# Patient Record
Sex: MALE | Race: WHITE | NOT HISPANIC OR LATINO | Employment: OTHER | URBAN - METROPOLITAN AREA
[De-identification: names, ages, dates, MRNs, and addresses within clinical notes are randomized per-mention and may not be internally consistent; named-entity substitution may affect disease eponyms.]

---

## 2023-02-13 ENCOUNTER — HOSPITAL ENCOUNTER (OUTPATIENT)
Facility: HOSPITAL | Age: 53
Discharge: HOME | End: 2023-02-13
Attending: EMERGENCY MEDICINE | Admitting: EMERGENCY MEDICINE
Payer: MEDICARE

## 2023-02-13 VITALS
OXYGEN SATURATION: 97 % | SYSTOLIC BLOOD PRESSURE: 147 MMHG | WEIGHT: 265 LBS | HEART RATE: 79 BPM | BODY MASS INDEX: 44.15 KG/M2 | TEMPERATURE: 98.4 F | RESPIRATION RATE: 18 BRPM | HEIGHT: 65 IN | DIASTOLIC BLOOD PRESSURE: 83 MMHG

## 2023-02-13 DIAGNOSIS — H10.9 CONJUNCTIVITIS, UNSPECIFIED CONJUNCTIVITIS TYPE, UNSPECIFIED LATERALITY: Primary | ICD-10-CM

## 2023-02-13 PROCEDURE — G0463 HOSPITAL OUTPT CLINIC VISIT: HCPCS | Performed by: EMERGENCY MEDICINE

## 2023-02-13 PROCEDURE — 99203 OFFICE O/P NEW LOW 30 MIN: CPT | Performed by: EMERGENCY MEDICINE

## 2023-02-13 RX ORDER — POLYMYXIN B SULFATE AND TRIMETHOPRIM 1; 10000 MG/ML; [USP'U]/ML
1 SOLUTION OPHTHALMIC EVERY 4 HOURS
Qty: 10 ML | Refills: 0 | Status: SHIPPED | OUTPATIENT
Start: 2023-02-13 | End: 2023-02-20

## 2023-02-13 NOTE — FSED PROVIDER NOTE
Subjective   History of Present Illness  52-year-old male presents the emergency room with left eye drainage and tearing denies wearing any contacts denies any pain upon extraocular motor denies any blurred vision denies any fevers or chills nausea headache or neck stiffness denies a cough congestion nausea vomiting diarrhea denies any flank pain chest pain or shortness of breath  ED for further eval        Review of Systems   Constitutional: Negative.    HENT: Negative.    Eyes: Positive for discharge and itching.   Respiratory: Negative.    Cardiovascular: Negative.    Gastrointestinal: Negative.    Endocrine: Negative.    Genitourinary: Negative.    Skin: Negative.    Allergic/Immunologic: Negative.    Neurological: Negative.    Hematological: Negative.    Psychiatric/Behavioral: Negative.        No past medical history on file.    Allergies   Allergen Reactions   • Wellbutrin [Bupropion] Hives       No past surgical history on file.    No family history on file.             Objective   Physical Exam  Vitals and nursing note reviewed.   Constitutional:       Appearance: Normal appearance.   HENT:      Head: Normocephalic.      Right Ear: Tympanic membrane and ear canal normal.      Left Ear: Tympanic membrane and ear canal normal.      Nose: Nose normal.      Mouth/Throat:      Mouth: Mucous membranes are moist.      Pharynx: Oropharynx is clear.   Eyes:      Extraocular Movements: Extraocular movements intact.      Conjunctiva/sclera:      Left eye: Left conjunctiva is injected. Exudate present.      Pupils: Pupils are equal, round, and reactive to light.   Cardiovascular:      Rate and Rhythm: Normal rate and regular rhythm.      Pulses: Normal pulses.      Heart sounds: Normal heart sounds.   Pulmonary:      Effort: Pulmonary effort is normal.      Breath sounds: Normal breath sounds.   Abdominal:      General: Abdomen is flat.      Palpations: Abdomen is soft.   Musculoskeletal:         General: Normal range  of motion.      Cervical back: Normal range of motion and neck supple.   Skin:     General: Skin is warm and dry.      Capillary Refill: Capillary refill takes less than 2 seconds.   Neurological:      General: No focal deficit present.      Mental Status: He is alert and oriented to person, place, and time.   Psychiatric:         Mood and Affect: Mood normal.         Behavior: Behavior normal.         Procedures           ED Course                                           Medical Decision Making  Patient has exam findings consistent with conjunctivitis does not wear contacts will prescribe patient Polytrim will discharge patient with primary care follow-up and question precautions patient expressed understanding treatment plan        Final diagnoses:   Conjunctivitis, unspecified conjunctivitis type, unspecified laterality       ED Disposition  ED Disposition     ED Disposition   Discharge    Condition   Stable    Comment   --             PATIENT [facility] - [place] [id number]  [phone]             Medication List      New Prescriptions    trimethoprim-polymyxin b 46116-2.1 UNIT/ML-% ophthalmic solution  Commonly known as: Polytrim  Administer 1 drop into the left eye Every 4 (Four) Hours for 7 days.           Where to Get Your Medications      These medications were sent to [facility] [id number] - [place], [place] - [facility] - [phone] [facility] [phone] [address] [place] [address]    Phone: [phone]   · trimethoprim-polymyxin b 57717-1.1 UNIT/ML-% ophthalmic solution

## 2023-02-16 ENCOUNTER — HOSPITAL ENCOUNTER (EMERGENCY)
Facility: HOSPITAL | Age: 53
Discharge: HOME | End: 2023-02-17
Attending: EMERGENCY MEDICINE | Admitting: EMERGENCY MEDICINE
Payer: MEDICARE

## 2023-02-16 VITALS
RESPIRATION RATE: 18 BRPM | DIASTOLIC BLOOD PRESSURE: 67 MMHG | HEIGHT: 65 IN | BODY MASS INDEX: 44.15 KG/M2 | TEMPERATURE: 98.7 F | OXYGEN SATURATION: 95 % | WEIGHT: 265 LBS | SYSTOLIC BLOOD PRESSURE: 120 MMHG | HEART RATE: 83 BPM

## 2023-02-16 DIAGNOSIS — R19.7 DIARRHEA, UNSPECIFIED TYPE: Primary | ICD-10-CM

## 2023-02-16 LAB
FLUAV SUBTYP SPEC NAA+PROBE: NOT DETECTED
FLUBV RNA ISLT QL NAA+PROBE: NOT DETECTED
SARS-COV-2 RNA RESP QL NAA+PROBE: NOT DETECTED

## 2023-02-16 PROCEDURE — 87636 SARSCOV2 & INF A&B AMP PRB: CPT

## 2023-02-16 PROCEDURE — 99283 EMERGENCY DEPT VISIT LOW MDM: CPT

## 2023-02-16 PROCEDURE — C9803 HOPD COVID-19 SPEC COLLECT: HCPCS

## 2023-02-17 ENCOUNTER — APPOINTMENT (OUTPATIENT)
Dept: CT IMAGING | Facility: HOSPITAL | Age: 53
End: 2023-02-17
Payer: MEDICARE

## 2023-02-17 LAB
ALBUMIN SERPL-MCNC: 4.3 G/DL (ref 3.5–5.2)
ALBUMIN/GLOB SERPL: 1.3 G/DL
ALP SERPL-CCNC: 85 U/L (ref 39–117)
ALT SERPL W P-5'-P-CCNC: 26 U/L (ref 1–41)
ANION GAP SERPL CALCULATED.3IONS-SCNC: 10.5 MMOL/L (ref 5–15)
AST SERPL-CCNC: 18 U/L (ref 1–40)
BASOPHILS # BLD AUTO: 0.04 10*3/MM3 (ref 0–0.2)
BASOPHILS NFR BLD AUTO: 0.2 % (ref 0–1.5)
BILIRUB SERPL-MCNC: 0.5 MG/DL (ref 0–1.2)
BILIRUB UR QL STRIP: NEGATIVE
BUN SERPL-MCNC: 36 MG/DL (ref 6–20)
BUN/CREAT SERPL: 27.1 (ref 7–25)
CALCIUM SPEC-SCNC: 9.4 MG/DL (ref 8.6–10.5)
CHLORIDE SERPL-SCNC: 102 MMOL/L (ref 98–107)
CLARITY UR: CLEAR
CO2 SERPL-SCNC: 23.5 MMOL/L (ref 22–29)
COLOR UR: YELLOW
CREAT SERPL-MCNC: 1.33 MG/DL (ref 0.76–1.27)
DEPRECATED RDW RBC AUTO: 44.3 FL (ref 37–54)
EGFRCR SERPLBLD CKD-EPI 2021: 63.9 ML/MIN/1.73
EOSINOPHIL # BLD AUTO: 0.32 10*3/MM3 (ref 0–0.4)
EOSINOPHIL NFR BLD AUTO: 1.9 % (ref 0.3–6.2)
ERYTHROCYTE [DISTWIDTH] IN BLOOD BY AUTOMATED COUNT: 13.1 % (ref 12.3–15.4)
GLOBULIN UR ELPH-MCNC: 3.4 GM/DL
GLUCOSE SERPL-MCNC: 86 MG/DL (ref 65–99)
GLUCOSE UR STRIP-MCNC: NEGATIVE MG/DL
HCT VFR BLD AUTO: 45.5 % (ref 37.5–51)
HGB BLD-MCNC: 14.7 G/DL (ref 13–17.7)
HGB UR QL STRIP.AUTO: NEGATIVE
IMM GRANULOCYTES # BLD AUTO: 0.17 10*3/MM3 (ref 0–0.05)
IMM GRANULOCYTES NFR BLD AUTO: 1 % (ref 0–0.5)
KETONES UR QL STRIP: NEGATIVE
LEUKOCYTE ESTERASE UR QL STRIP.AUTO: NEGATIVE
LYMPHOCYTES # BLD AUTO: 2.07 10*3/MM3 (ref 0.7–3.1)
LYMPHOCYTES NFR BLD AUTO: 12.5 % (ref 19.6–45.3)
MCH RBC QN AUTO: 29.2 PG (ref 26.6–33)
MCHC RBC AUTO-ENTMCNC: 32.3 G/DL (ref 31.5–35.7)
MCV RBC AUTO: 90.3 FL (ref 79–97)
MONOCYTES # BLD AUTO: 1.16 10*3/MM3 (ref 0.1–0.9)
MONOCYTES NFR BLD AUTO: 7 % (ref 5–12)
NEUTROPHILS NFR BLD AUTO: 12.75 10*3/MM3 (ref 1.7–7)
NEUTROPHILS NFR BLD AUTO: 77.4 % (ref 42.7–76)
NITRITE UR QL STRIP: NEGATIVE
PH UR STRIP.AUTO: 5.5 [PH] (ref 5–8)
PLATELET # BLD AUTO: 344 10*3/MM3 (ref 140–450)
PMV BLD AUTO: 9.5 FL (ref 6–12)
POTASSIUM SERPL-SCNC: 5.1 MMOL/L (ref 3.5–5.2)
PROT SERPL-MCNC: 7.7 G/DL (ref 6–8.5)
PROT UR QL STRIP: ABNORMAL
RBC # BLD AUTO: 5.04 10*6/MM3 (ref 4.14–5.8)
SODIUM SERPL-SCNC: 136 MMOL/L (ref 136–145)
SP GR UR STRIP: 1.02 (ref 1–1.03)
UROBILINOGEN UR QL STRIP: ABNORMAL
WBC NRBC COR # BLD: 16.51 10*3/MM3 (ref 3.4–10.8)

## 2023-02-17 PROCEDURE — 25010000002 ONDANSETRON PER 1 MG: Performed by: EMERGENCY MEDICINE

## 2023-02-17 PROCEDURE — 85025 COMPLETE CBC W/AUTO DIFF WBC: CPT | Performed by: EMERGENCY MEDICINE

## 2023-02-17 PROCEDURE — 74177 CT ABD & PELVIS W/CONTRAST: CPT

## 2023-02-17 PROCEDURE — 81003 URINALYSIS AUTO W/O SCOPE: CPT | Performed by: EMERGENCY MEDICINE

## 2023-02-17 PROCEDURE — 99283 EMERGENCY DEPT VISIT LOW MDM: CPT | Performed by: EMERGENCY MEDICINE

## 2023-02-17 PROCEDURE — 0 IOPAMIDOL PER 1 ML: Performed by: EMERGENCY MEDICINE

## 2023-02-17 PROCEDURE — 96375 TX/PRO/DX INJ NEW DRUG ADDON: CPT

## 2023-02-17 PROCEDURE — 96374 THER/PROPH/DIAG INJ IV PUSH: CPT

## 2023-02-17 PROCEDURE — 80053 COMPREHEN METABOLIC PANEL: CPT | Performed by: EMERGENCY MEDICINE

## 2023-02-17 PROCEDURE — 96361 HYDRATE IV INFUSION ADD-ON: CPT

## 2023-02-17 RX ORDER — ONDANSETRON 4 MG/1
4 TABLET, ORALLY DISINTEGRATING ORAL EVERY 8 HOURS PRN
Qty: 12 TABLET | Refills: 0 | Status: SHIPPED | OUTPATIENT
Start: 2023-02-17

## 2023-02-17 RX ORDER — METRONIDAZOLE 500 MG/1
500 TABLET ORAL 2 TIMES DAILY
Qty: 14 TABLET | Refills: 0 | Status: SHIPPED | OUTPATIENT
Start: 2023-02-17 | End: 2023-02-17 | Stop reason: SDUPTHER

## 2023-02-17 RX ORDER — FAMOTIDINE 10 MG/ML
20 INJECTION, SOLUTION INTRAVENOUS ONCE
Status: COMPLETED | OUTPATIENT
Start: 2023-02-17 | End: 2023-02-17

## 2023-02-17 RX ORDER — METRONIDAZOLE 500 MG/1
500 TABLET ORAL 2 TIMES DAILY
Qty: 14 TABLET | Refills: 0 | Status: SHIPPED | OUTPATIENT
Start: 2023-02-17 | End: 2023-02-24

## 2023-02-17 RX ORDER — ONDANSETRON 4 MG/1
4 TABLET, ORALLY DISINTEGRATING ORAL EVERY 8 HOURS PRN
Qty: 12 TABLET | Refills: 0 | Status: SHIPPED | OUTPATIENT
Start: 2023-02-17 | End: 2023-02-17 | Stop reason: SDUPTHER

## 2023-02-17 RX ORDER — ONDANSETRON 2 MG/ML
4 INJECTION INTRAMUSCULAR; INTRAVENOUS ONCE
Status: COMPLETED | OUTPATIENT
Start: 2023-02-17 | End: 2023-02-17

## 2023-02-17 RX ADMIN — FAMOTIDINE 20 MG: 10 INJECTION, SOLUTION INTRAVENOUS at 02:40

## 2023-02-17 RX ADMIN — IOPAMIDOL 100 ML: 755 INJECTION, SOLUTION INTRAVENOUS at 01:34

## 2023-02-17 RX ADMIN — SODIUM CHLORIDE 1000 ML: 0.9 INJECTION, SOLUTION INTRAVENOUS at 01:45

## 2023-02-17 RX ADMIN — ONDANSETRON 4 MG: 2 INJECTION INTRAMUSCULAR; INTRAVENOUS at 02:40

## 2023-02-17 NOTE — FSED PROVIDER NOTE
Subjective   History of Present Illness  52 yom complains of loss of appetite and diarrhea for 5 days. Pt notes he has brown diarrhea for the past 4-5 days. He also notes that he hasn't had his normal appetite.Pt reports when he sits down to eat he loses his appetite after a couple of bites. Pt denies nausea or vomiting. Pt denies fever, bad food exposure, recent travel or sick contacts.         Review of Systems   Constitutional: Positive for appetite change. Negative for chills and fever.   Respiratory: Negative.    Cardiovascular: Negative.    Gastrointestinal: Positive for diarrhea.   Musculoskeletal: Negative.    Skin: Negative.    All other systems reviewed and are negative.      Past Medical History:   Diagnosis Date   • Diabetes mellitus (HCC)    • Diverticulitis    • Osteoarthritis        Allergies   Allergen Reactions   • Wellbutrin [Bupropion] Hives       History reviewed. No pertinent surgical history.    History reviewed. No pertinent family history.    Social History     Socioeconomic History   • Marital status: Single           Objective   Physical Exam  Vitals reviewed.   Constitutional:       General: He is not in acute distress.     Appearance: Normal appearance. He is obese. He is not ill-appearing.   HENT:      Head: Normocephalic and atraumatic.      Nose: Nose normal.      Mouth/Throat:      Mouth: Mucous membranes are moist.      Pharynx: Oropharynx is clear.   Eyes:      Extraocular Movements: Extraocular movements intact.      Pupils: Pupils are equal, round, and reactive to light.   Cardiovascular:      Rate and Rhythm: Normal rate and regular rhythm.      Pulses: Normal pulses.      Heart sounds: Normal heart sounds.   Pulmonary:      Effort: Pulmonary effort is normal.      Breath sounds: Normal breath sounds.   Abdominal:      General: There is distension.      Palpations: Abdomen is soft.      Tenderness: There is no abdominal tenderness. There is no guarding or rebound.   Skin:      General: Skin is warm and dry.   Neurological:      Mental Status: He is alert.         Procedures           ED Course    EXAMINATION: CT ABDOMEN AND PELVIS WITH IV CONTRAST        DATE OF EXAMINATION: 2/17/2023.     COMPARISON: None available.     INDICATION: Abdominal pain and diarrhea.     PROCEDURE:  Axial CT of the abdomen and pelvis was performed with contrast and sagittal and coronal reformatted images were performed.  100 mL of Isovue-370 was given intravenously. CT dose lowering techniques were used, to include: automated exposure   control, adjustment for patient size, and/or use of iterative reconstruction.     FINDINGS:     LOWER CHEST :  The visualized lung bases are clear.  There are no pleural or pericardial effusions.     ABDOMEN:     Liver and Biliary system:  There is a subtle nodular contour to the liver which is compatible with cirrhosis. There are a few subcentimeter hypodensities seen throughout the liver which are too small fully characterize.     Adrenal glands:  Normal.     Kidneys and ureters: There is a 1.5 cm cyst in the upper pole of the right kidney. There is a 2 cm cyst in the interpolar region of the left kidney. The kidneys otherwise appear unremarkable     Spleen:  Normal.     Pancreas:  Normal.     Gallbladder:  Normal.     Lymph nodes, Peritoneum and mesentery:  There is no mesenteric or retroperitoneal lymphadenopathy.     Gastrointestinal tract:  There are no dilated loops of bowel or free intraperitoneal air.    The appendix is normal. There is mild descending colonic and sigmoid colonic diverticulosis without evidence of diverticulitis.     Aorta/IVC:   There is mild vascular calcification throughout the abdominal aorta without evidence of aneurysmal dilation or dissection  IVC normal.     Abdominal wall:  Normal.     PELVIS:     Fluid: There is no free fluid in the pelvis.     Lymph Nodes:  There is no pelvic or inguinal lymphadenopathy..     Urinary bladder:   Normal.     BONES:  There are no osseous destructive lesions..     ADDITIONAL  SIGNIFICANT FINDINGS:  None.     IMPRESSION:        1. No acute process seen within the abdomen or pelvis.  2. Nodular contour to the liver is suggestive of cirrhosis.  3. Diverticulosis without evidence of diverticulitis.        Slot 94.              Electronically signed by:  Rufino Blount D.O.    2/16/2023 11:55 PM Mountain Time                                       Medical Decision Making  Abdominal exam without peritoneal signs. No evidence of acute abdomen at this time. Well appearing. Given work up, low suspicion for acute hepatobiliary disease (including acute cholecystitis or cholangitis), acute pancreatitis (neg lipase), PUD (including gastric perforation), acute infectious processes (pneumonia, hepatitis, pyelonephritis), acute appendicitis, vascular catastrophe, bowel obstruction, viscus perforation, or testicular torsion, diverticulitis. Presentation not consistent with other acute, emergent causes of abdominal pain at this time.        Diarrhea, unspecified type: acute illness or injury  Amount and/or Complexity of Data Reviewed  Labs: ordered.  Radiology: ordered.      Risk  Prescription drug management.          Final diagnoses:   Diarrhea, unspecified type       ED Disposition  ED Disposition     ED Disposition   Discharge    Condition   Stable    Comment   --             West Gonzalez MD  [address] [place], [place] [id number] [phone]    Schedule an appointment as soon as possible for a visit on 2/21/2023      [facility]  [address] [facility] [address] [phone]             Medication List      New Prescriptions    metroNIDAZOLE 500 MG tablet  Commonly known as: FLAGYL  Take 1 tablet by mouth 2 (Two) Times a Day for 7 days.     ondansetron ODT 4 MG disintegrating tablet  Commonly known as: ZOFRAN-ODT  Place 1 tablet on the tongue Every  8 (Eight) Hours As Needed for Nausea.           Where to Get Your Medications      These medications were sent to [facility] [id number] - [facility] [place] - [address] [facility] - [phone]  - [phone] [facility] [place] [address]    Phone: [phone]   · metroNIDAZOLE 500 MG tablet  · ondansetron ODT 4 MG disintegrating tablet